# Patient Record
Sex: FEMALE | Race: WHITE | NOT HISPANIC OR LATINO | Employment: FULL TIME | URBAN - METROPOLITAN AREA
[De-identification: names, ages, dates, MRNs, and addresses within clinical notes are randomized per-mention and may not be internally consistent; named-entity substitution may affect disease eponyms.]

---

## 2023-03-30 LAB
ERYTHROCYTE DISTRIBUTION WIDTH (RATIO) BY AUTOMATED COUNT: 12.7 % (ref 11.5–14.5)
ERYTHROCYTE MEAN CORPUSCULAR HEMOGLOBIN CONCENTRATION (G/DL) BY AUTOMATED: 32.2 G/DL (ref 32–36)
ERYTHROCYTE MEAN CORPUSCULAR VOLUME (FL) BY AUTOMATED COUNT: 86 FL (ref 80–100)
ERYTHROCYTES (10*6/UL) IN BLOOD BY AUTOMATED COUNT: 4.73 X10E12/L (ref 4–5.2)
ESTIMATED AVERAGE GLUCOSE FOR HBA1C: 65 MG/DL
HEMATOCRIT (%) IN BLOOD BY AUTOMATED COUNT: 40.7 % (ref 36–46)
HEMOGLOBIN (G/DL) IN BLOOD: 13.1 G/DL (ref 12–16)
HEMOGLOBIN A1C/HEMOGLOBIN TOTAL IN BLOOD: 3.9 %
LEUKOCYTES (10*3/UL) IN BLOOD BY AUTOMATED COUNT: 10.4 X10E9/L (ref 4.4–11.3)
NRBC (PER 100 WBCS) BY AUTOMATED COUNT: 0 /100 WBC (ref 0–0)
PLATELETS (10*3/UL) IN BLOOD AUTOMATED COUNT: 332 X10E9/L (ref 150–450)
THYROTROPIN (MIU/L) IN SER/PLAS BY DETECTION LIMIT <= 0.05 MIU/L: 1 MIU/L (ref 0.44–3.98)

## 2023-07-30 ENCOUNTER — HOSPITAL ENCOUNTER (OUTPATIENT)
Dept: DATA CONVERSION | Facility: HOSPITAL | Age: 23
Discharge: HOME | End: 2023-07-30

## 2023-07-30 DIAGNOSIS — O46.91: Primary | ICD-10-CM

## 2023-07-30 DIAGNOSIS — O23.41 UNSPECIFIED INFECTION OF URINARY TRACT IN PREGNANCY, FIRST TRIMESTER (HHS-HCC): ICD-10-CM

## 2023-07-30 DIAGNOSIS — O99.891 OTHER SPECIFIED DISEASES AND CONDITIONS COMPLICATING PREGNANCY (HHS-HCC): ICD-10-CM

## 2023-07-30 DIAGNOSIS — Z3A.01 LESS THAN 8 WEEKS GESTATION OF PREGNANCY (HHS-HCC): ICD-10-CM

## 2023-07-30 DIAGNOSIS — R10.9 UNSPECIFIED ABDOMINAL PAIN: ICD-10-CM

## 2023-07-30 DIAGNOSIS — N39.0 URINARY TRACT INFECTION, SITE NOT SPECIFIED: ICD-10-CM

## 2023-07-30 LAB
ABO + RH BLD: NORMAL
BACTERIA SPEC CULT: NORMAL
BASOPHILS # BLD AUTO: 0.03 K/UL (ref 0–0.22)
BASOPHILS NFR BLD AUTO: 0.4 % (ref 0–1)
BILIRUB UR QL STRIP.AUTO: NEGATIVE
BLD GP AB SCN SERPL QL: NORMAL
BLD PROD TYP BPU: NORMAL
CC # UR: NORMAL /UL
CLARITY UR: ABNORMAL
COLOR UR: ABNORMAL
DEPRECATED RDW RBC AUTO: 40.5 FL (ref 37–54)
DIFFERENTIAL METHOD BLD: NORMAL
EOSINOPHIL # BLD AUTO: 0.14 K/UL (ref 0–0.45)
EOSINOPHIL NFR BLD: 1.8 % (ref 0–3)
EPITH CASTS #/AREA UR COMP ASSIST: ABNORMAL /HPF
ERYTHROCYTE [DISTWIDTH] IN BLOOD BY AUTOMATED COUNT: 13.1 % (ref 11.7–15)
GLUCOSE UR STRIP.AUTO-MCNC: NEGATIVE MG/DL
HCG SERPL-ACNC: 1 MIU/ML
HCT VFR BLD AUTO: 38.3 % (ref 36–44)
HGB BLD-MCNC: 12.9 GM/DL (ref 12–15)
HGB UR QL STRIP.AUTO: ABNORMAL /HPF (ref 0–3)
HGB UR QL: ABNORMAL
HX OF BLOOD TRANSFUSION: NORMAL
IMM GRANULOCYTES # BLD AUTO: 0.04 K/UL (ref 0–0.1)
KETONES UR QL STRIP.AUTO: ABNORMAL
LEUKOCYTE ESTERASE UR QL STRIP.AUTO: ABNORMAL
LYMPHOCYTES # BLD AUTO: 2.97 K/UL (ref 1.2–3.2)
LYMPHOCYTES NFR BLD MANUAL: 39.1 % (ref 20–40)
MCH RBC QN AUTO: 28.4 PG (ref 26–34)
MCHC RBC AUTO-ENTMCNC: 33.7 % (ref 31–37)
MCV RBC AUTO: 84.4 FL (ref 80–100)
MICRO URNS: ABNORMAL
MICROSCOPIC (UA): ABNORMAL
MONOCYTES # BLD AUTO: 0.48 K/UL (ref 0–0.8)
MONOCYTES NFR BLD MANUAL: 6.3 % (ref 0–8)
NEUTROPHILS # BLD AUTO: 3.94 K/UL
NEUTROPHILS # BLD AUTO: 3.94 K/UL (ref 1.8–7.7)
NEUTROPHILS.IMMATURE NFR BLD: 0.5 % (ref 0–1)
NEUTS SEG NFR BLD: 51.9 % (ref 50–70)
NITRITE UR QL STRIP.AUTO: NEGATIVE
NRBC BLD-RTO: 0 /100 WBC
NUM BPU REQUESTED: NORMAL
PH UR STRIP.AUTO: 5.5 [PH] (ref 4.6–8)
PLATELET # BLD AUTO: 263 K/UL (ref 150–450)
PMV BLD AUTO: 12.4 CU (ref 7–12.6)
PROT UR STRIP.AUTO-MCNC: 200 MG/DL
RBC # BLD AUTO: 4.54 M/UL (ref 4–4.9)
REPORT STATUS -LH SQ DATA CONVERSION: NORMAL
SERVICE CMNT-IMP: NORMAL
SP GR UR STRIP.AUTO: 1.02 (ref 1–1.03)
SPECIMEN EXP DATE BLD: NORMAL
SPECIMEN SOURCE: NORMAL
TEST ORDERED: NORMAL
TRANSF BAND NUM PATIENT: NORMAL
URINE CULTURE: ABNORMAL
UROBILINOGEN UR QL STRIP.AUTO: NORMAL MG/DL (ref 0–1)
WBC # BLD AUTO: 7.6 K/UL (ref 4.5–11)
WBC #/AREA URNS AUTO: ABNORMAL /HPF (ref 0–3)

## 2023-09-30 PROBLEM — J02.0 STREPTOCOCCAL SORE THROAT: Status: ACTIVE | Noted: 2023-09-30

## 2023-09-30 PROBLEM — R35.89 POLYURIA: Status: ACTIVE | Noted: 2023-09-30

## 2023-09-30 PROBLEM — M54.50 LOW BACK PAIN: Status: ACTIVE | Noted: 2023-09-30

## 2023-09-30 PROBLEM — K21.9 GERD (GASTROESOPHAGEAL REFLUX DISEASE): Status: ACTIVE | Noted: 2023-09-30

## 2023-09-30 PROBLEM — N39.0 ACUTE URINARY TRACT INFECTION: Status: ACTIVE | Noted: 2023-09-30

## 2023-09-30 PROBLEM — B99.9 INFECTION: Status: ACTIVE | Noted: 2023-09-30

## 2023-09-30 PROBLEM — M25.50 JOINT PAIN: Status: ACTIVE | Noted: 2023-09-30

## 2023-09-30 PROBLEM — R06.83 SNORING: Status: ACTIVE | Noted: 2023-09-30

## 2023-09-30 PROBLEM — S60.229A CONTUSION OF HAND: Status: ACTIVE | Noted: 2023-09-30

## 2023-09-30 PROBLEM — M25.549 ARTHRALGIA OF HAND: Status: ACTIVE | Noted: 2023-09-30

## 2023-09-30 PROBLEM — L08.9 RECURRENT INFECTION OF SKIN: Status: ACTIVE | Noted: 2023-09-30

## 2023-09-30 PROBLEM — N93.9 ABNORMAL VAGINAL BLEEDING: Status: ACTIVE | Noted: 2023-09-30

## 2023-09-30 PROBLEM — R53.83 FATIGUE: Status: ACTIVE | Noted: 2023-09-30

## 2023-09-30 PROBLEM — R73.9 HYPERGLYCEMIA: Status: ACTIVE | Noted: 2023-09-30

## 2023-09-30 PROBLEM — J30.2 SEASONAL ALLERGIC RHINITIS: Status: ACTIVE | Noted: 2023-09-30

## 2023-09-30 PROBLEM — R31.9 BLOOD IN URINE: Status: ACTIVE | Noted: 2023-09-30

## 2023-09-30 PROBLEM — B37.31 VAGINAL YEAST INFECTION: Status: ACTIVE | Noted: 2023-09-30

## 2023-09-30 PROBLEM — J20.9 ACUTE BRONCHITIS: Status: ACTIVE | Noted: 2023-09-30

## 2023-09-30 PROBLEM — R07.81 PLEURITIC CHEST PAIN: Status: ACTIVE | Noted: 2023-09-30

## 2023-09-30 PROBLEM — L02.419 ABSCESS OF AXILLA: Status: ACTIVE | Noted: 2023-09-30

## 2023-09-30 PROBLEM — L50.8 CHRONIC URTICARIA: Status: ACTIVE | Noted: 2023-09-30

## 2023-09-30 PROBLEM — L72.9 CYST OF SKIN: Status: ACTIVE | Noted: 2023-09-30

## 2023-09-30 PROBLEM — R06.00 DYSPNEA: Status: ACTIVE | Noted: 2023-09-30

## 2023-09-30 PROBLEM — R76.8 ANA POSITIVE: Status: ACTIVE | Noted: 2023-09-30

## 2023-09-30 PROBLEM — N92.0 MENORRHAGIA: Status: ACTIVE | Noted: 2023-09-30

## 2023-09-30 PROBLEM — R11.0 NAUSEA IN ADULT: Status: ACTIVE | Noted: 2023-09-30

## 2023-09-30 PROBLEM — R52 PAIN: Status: ACTIVE | Noted: 2023-09-30

## 2023-09-30 PROBLEM — M62.89 HIGH-TONE PELVIC FLOOR DYSFUNCTION IN FEMALE: Status: ACTIVE | Noted: 2023-09-30

## 2023-09-30 PROBLEM — J02.9 SORE THROAT: Status: ACTIVE | Noted: 2023-09-30

## 2023-09-30 PROBLEM — E55.9 VITAMIN D DEFICIENCY: Status: ACTIVE | Noted: 2023-09-30

## 2023-09-30 PROBLEM — L68.0 HIRSUTISM: Status: ACTIVE | Noted: 2023-09-30

## 2023-09-30 PROBLEM — R79.89 ELEVATED LFTS: Status: ACTIVE | Noted: 2023-09-30

## 2023-09-30 PROBLEM — J06.9 ACUTE UPPER RESPIRATORY INFECTION: Status: ACTIVE | Noted: 2023-09-30

## 2023-09-30 PROBLEM — E28.2 PCOS (POLYCYSTIC OVARIAN SYNDROME): Status: ACTIVE | Noted: 2023-09-30

## 2023-09-30 PROBLEM — M32.9: Status: ACTIVE | Noted: 2023-09-30

## 2023-09-30 PROBLEM — F32.9 MAJOR DEPRESSIVE DISORDER: Status: ACTIVE | Noted: 2023-09-30

## 2023-09-30 PROBLEM — J01.90 ACUTE SINUSITIS: Status: ACTIVE | Noted: 2023-09-30

## 2023-09-30 PROBLEM — E78.1 HYPERTRIGLYCERIDEMIA: Status: ACTIVE | Noted: 2023-09-30

## 2023-09-30 PROBLEM — F41.8 DEPRESSION WITH ANXIETY: Status: ACTIVE | Noted: 2023-09-30

## 2023-09-30 PROBLEM — G47.19 EXCESSIVE DAYTIME SLEEPINESS: Status: ACTIVE | Noted: 2023-09-30

## 2023-09-30 PROBLEM — L65.9 HAIR LOSS: Status: ACTIVE | Noted: 2023-09-30

## 2023-09-30 PROBLEM — M54.2 NECK PAIN: Status: ACTIVE | Noted: 2023-09-30

## 2023-09-30 PROBLEM — D89.89 AUTOIMMUNE DISORDER (MULTI): Status: ACTIVE | Noted: 2023-09-30

## 2023-09-30 PROBLEM — M79.10 MYALGIA: Status: ACTIVE | Noted: 2023-09-30

## 2023-09-30 PROBLEM — S39.012A BACK STRAIN: Status: ACTIVE | Noted: 2023-09-30

## 2023-09-30 PROBLEM — R51.9 HEADACHE: Status: ACTIVE | Noted: 2023-09-30

## 2023-09-30 PROBLEM — R50.9 FEVER: Status: ACTIVE | Noted: 2023-09-30

## 2023-09-30 PROBLEM — R63.1 POLYDIPSIA: Status: ACTIVE | Noted: 2023-09-30

## 2023-10-02 ENCOUNTER — OFFICE VISIT (OUTPATIENT)
Dept: PRIMARY CARE | Facility: CLINIC | Age: 23
End: 2023-10-02
Payer: COMMERCIAL

## 2023-10-02 ENCOUNTER — LAB (OUTPATIENT)
Dept: LAB | Facility: LAB | Age: 23
End: 2023-10-02
Payer: COMMERCIAL

## 2023-10-02 VITALS
HEART RATE: 86 BPM | DIASTOLIC BLOOD PRESSURE: 68 MMHG | BODY MASS INDEX: 43.4 KG/M2 | WEIGHT: 293 LBS | OXYGEN SATURATION: 99 % | SYSTOLIC BLOOD PRESSURE: 120 MMHG | HEIGHT: 69 IN

## 2023-10-02 DIAGNOSIS — Z00.00 ROUTINE GENERAL MEDICAL EXAMINATION AT A HEALTH CARE FACILITY: ICD-10-CM

## 2023-10-02 DIAGNOSIS — E28.2 PCOS (POLYCYSTIC OVARIAN SYNDROME): Primary | ICD-10-CM

## 2023-10-02 DIAGNOSIS — R63.8 UNABLE TO LOSE WEIGHT: ICD-10-CM

## 2023-10-02 DIAGNOSIS — E66.01 CLASS 3 SEVERE OBESITY WITHOUT SERIOUS COMORBIDITY WITH BODY MASS INDEX (BMI) OF 40.0 TO 44.9 IN ADULT, UNSPECIFIED OBESITY TYPE (MULTI): ICD-10-CM

## 2023-10-02 DIAGNOSIS — E28.2 PCOS (POLYCYSTIC OVARIAN SYNDROME): ICD-10-CM

## 2023-10-02 LAB
ALBUMIN SERPL BCP-MCNC: 4 G/DL (ref 3.4–5)
ALP SERPL-CCNC: 100 U/L (ref 33–110)
ALT SERPL W P-5'-P-CCNC: 58 U/L (ref 7–45)
ANION GAP SERPL CALC-SCNC: 12 MMOL/L (ref 10–20)
AST SERPL W P-5'-P-CCNC: 32 U/L (ref 9–39)
BASOPHILS # BLD AUTO: 0.04 X10*3/UL (ref 0–0.1)
BASOPHILS NFR BLD AUTO: 0.5 %
BILIRUB SERPL-MCNC: 0.2 MG/DL (ref 0–1.2)
BUN SERPL-MCNC: 6 MG/DL (ref 6–23)
CALCIUM SERPL-MCNC: 9.8 MG/DL (ref 8.6–10.6)
CHLORIDE SERPL-SCNC: 107 MMOL/L (ref 98–107)
CHOLEST SERPL-MCNC: 129 MG/DL (ref 0–199)
CHOLESTEROL/HDL RATIO: 3.5
CO2 SERPL-SCNC: 24 MMOL/L (ref 21–32)
CREAT SERPL-MCNC: 0.53 MG/DL (ref 0.5–1.05)
EOSINOPHIL # BLD AUTO: 0.25 X10*3/UL (ref 0–0.7)
EOSINOPHIL NFR BLD AUTO: 3.2 %
ERYTHROCYTE [DISTWIDTH] IN BLOOD BY AUTOMATED COUNT: 12.6 % (ref 11.5–14.5)
EST. AVERAGE GLUCOSE BLD GHB EST-MCNC: 123 MG/DL
GFR SERPL CREATININE-BSD FRML MDRD: >90 ML/MIN/1.73M*2
GLUCOSE SERPL-MCNC: 115 MG/DL (ref 74–99)
HBA1C MFR BLD: 5.9 %
HCT VFR BLD AUTO: 40 % (ref 36–46)
HDLC SERPL-MCNC: 37.1 MG/DL
HGB BLD-MCNC: 12.6 G/DL (ref 12–16)
IMM GRANULOCYTES # BLD AUTO: 0.06 X10*3/UL (ref 0–0.7)
IMM GRANULOCYTES NFR BLD AUTO: 0.8 % (ref 0–0.9)
LDLC SERPL CALC-MCNC: 60 MG/DL (ref 110–150)
LYMPHOCYTES # BLD AUTO: 2.69 X10*3/UL (ref 1.2–4.8)
LYMPHOCYTES NFR BLD AUTO: 34.2 %
MCH RBC QN AUTO: 28 PG (ref 26–34)
MCHC RBC AUTO-ENTMCNC: 31.5 G/DL (ref 32–36)
MCV RBC AUTO: 89 FL (ref 80–100)
MONOCYTES # BLD AUTO: 0.7 X10*3/UL (ref 0.1–1)
MONOCYTES NFR BLD AUTO: 8.9 %
NEUTROPHILS # BLD AUTO: 4.13 X10*3/UL (ref 1.2–7.7)
NEUTROPHILS NFR BLD AUTO: 52.4 %
NON HDL CHOLESTEROL: 92 MG/DL (ref 0–149)
NRBC BLD-RTO: 0 /100 WBCS (ref 0–0)
PLATELET # BLD AUTO: 295 X10*3/UL (ref 150–450)
PMV BLD AUTO: 13.1 FL (ref 7.5–11.5)
POC APPEARANCE, URINE: CLEAR
POC BILIRUBIN, URINE: NEGATIVE
POC BLOOD, URINE: NEGATIVE
POC COLOR, URINE: YELLOW
POC GLUCOSE, URINE: NEGATIVE MG/DL
POC KETONES, URINE: NEGATIVE MG/DL
POC LEUKOCYTES, URINE: NEGATIVE
POC NITRITE,URINE: NEGATIVE
POC PH, URINE: 6 PH
POC PROTEIN, URINE: NEGATIVE MG/DL
POC SPECIFIC GRAVITY, URINE: 1.02
POC UROBILINOGEN, URINE: 0.2 EU/DL
POTASSIUM SERPL-SCNC: 4.4 MMOL/L (ref 3.5–5.3)
PROT SERPL-MCNC: 6.7 G/DL (ref 6.4–8.2)
RBC # BLD AUTO: 4.5 X10*6/UL (ref 4–5.2)
SODIUM SERPL-SCNC: 139 MMOL/L (ref 136–145)
TRIGL SERPL-MCNC: 162 MG/DL (ref 0–149)
VLDL: 32 MG/DL (ref 0–40)
WBC # BLD AUTO: 7.9 X10*3/UL (ref 4.4–11.3)

## 2023-10-02 PROCEDURE — 99395 PREV VISIT EST AGE 18-39: CPT | Performed by: NURSE PRACTITIONER

## 2023-10-02 PROCEDURE — 36415 COLL VENOUS BLD VENIPUNCTURE: CPT

## 2023-10-02 PROCEDURE — 3008F BODY MASS INDEX DOCD: CPT | Performed by: NURSE PRACTITIONER

## 2023-10-02 PROCEDURE — 81003 URINALYSIS AUTO W/O SCOPE: CPT | Performed by: NURSE PRACTITIONER

## 2023-10-02 PROCEDURE — 1036F TOBACCO NON-USER: CPT | Performed by: NURSE PRACTITIONER

## 2023-10-02 RX ORDER — TRAZODONE HYDROCHLORIDE 50 MG/1
50 TABLET ORAL NIGHTLY
COMMUNITY

## 2023-10-02 RX ORDER — METFORMIN HYDROCHLORIDE 750 MG/1
750 TABLET, EXTENDED RELEASE ORAL
COMMUNITY
End: 2023-12-22 | Stop reason: WASHOUT

## 2023-10-02 ASSESSMENT — ENCOUNTER SYMPTOMS
ALLERGIC/IMMUNOLOGIC NEGATIVE: 1
CARDIOVASCULAR NEGATIVE: 1
MUSCULOSKELETAL NEGATIVE: 1
GASTROINTESTINAL NEGATIVE: 1
RESPIRATORY NEGATIVE: 1
HEMATOLOGIC/LYMPHATIC NEGATIVE: 1
UNEXPECTED WEIGHT CHANGE: 1
EYES NEGATIVE: 1
NEUROLOGICAL NEGATIVE: 1
PSYCHIATRIC NEGATIVE: 1

## 2023-10-02 NOTE — PATIENT INSTRUCTIONS
Obtain blood work as ordered    Work on healthy diet and exercise routine    Await blood work results for follow up plan

## 2023-10-02 NOTE — PROGRESS NOTES
"                                                                                                                     GENERAL PROGRESS NOTE    Chief Complaint   Patient presents with    Annual Exam     Discuss weight gain since   Sees GYN- Last pap was 2022       HPI  Royal Carter is a 23 y.o. female New pt who presents for CPE  [facility]  reviewed    Diet is varied, eats a lot of veggies  Pt Is exercise 3-4 x per week  Nonsmoker    GYN Dr Brice    [id number]  Has 18 month old daughter    Has weight issues  Has tried everything including diet  and exercise  Is impacting ability to be a good mo    Vital signs   /68 (BP Location: Left arm, Patient Position: Sitting)   Pulse 86   Ht 1.753 m (5' 9\")   Wt 136 kg (299 lb)   SpO2 99%   BMI 44.15 kg/m²      Current Outpatient Medications   Medication Sig Dispense Refill    metFORMIN XR (Glucophage-XR) 750 mg 24 hr tablet Take 1 tablet (750 mg) by mouth once daily in the evening. Take with meals. Do not crush, chew, or split.      traZODone (Desyrel) 50 mg tablet Take 1 tablet (50 mg) by mouth once daily at bedtime.       No current facility-administered medications for this visit.       Review of Systems   Constitutional:  Positive for unexpected weight change.   HENT: Negative.     Eyes: Negative.    Respiratory: Negative.     Cardiovascular: Negative.    Gastrointestinal: Negative.    Genitourinary: Negative.    Musculoskeletal: Negative.    Skin: Negative.    Allergic/Immunologic: Negative.    Neurological: Negative.    Hematological: Negative.    Psychiatric/Behavioral: Negative.              Physical Exam    A/O x3 NAD  HEENT normal  Neck supple, no lymphadenopathy, no thyromegaly, no carotid bruit  Heart with RRR  Lungs CTA bilaterally  Abd soft NT/ND, no masses  Skin warm and dry  Neuro grossly intact. [place]  Mood and affect appropriate.  Good insight and judgement    Assessment/Plan   Problem List Items Addressed This Visit          Endocrine/Metabolic    " PCOS (polycystic ovarian syndrome) - Primary    Relevant Orders    Comprehensive metabolic panel    Hemoglobin A1c    Lipid panel     Other Visit Diagnoses       Routine general medical examination at a health care facility        Relevant Orders    POCT UA Automated manually resulted    CBC and Auto Differential    Comprehensive metabolic panel    Hemoglobin A1c    Lipid panel    Urinalysis with Reflex Microscopic    Unable to lose weight        Relevant Orders    Comprehensive metabolic panel    Hemoglobin A1c    Lipid panel    Class 3 severe obesity without serious comorbidity with body mass index (BMI) of 40.0 to 44.9 in adult, unspecified obesity type (CMS/HCC)        Relevant Orders    Comprehensive metabolic panel    Hemoglobin A1c    Lipid panel             Orders Placed This Encounter   Procedures    CBC and Auto Differential     Standing Status:   Future     Standing Expiration Date:   10/2/2024     Order Specific Question:   Release result to MyChart     Answer:   Immediate [1]    Comprehensive metabolic panel     Standing Status:   Future     Standing Expiration Date:   10/2/2024     Order Specific Question:   Release result to MyChart     Answer:   Immediate [1]    Hemoglobin A1c     Standing Status:   Future     Standing Expiration Date:   10/2/2024     Order Specific Question:   Release result to MyChart     Answer:   Immediate [1]    Lipid panel     Standing Status:   Future     Standing Expiration Date:   10/2/2024     Order Specific Question:   Release result to MyChart     Answer:   Immediate [1]    Urinalysis with Reflex Microscopic     Standing Status:   Future     Standing Expiration Date:   10/2/2024     Order Specific Question:   Release result to MyChart     Answer:   Immediate [1]    POCT UA Automated manually resulted     Order Specific Question:   Release result to MyChart     Answer:   Immediate [1]        BRENNA Wang-CNP  10/02/23  8:15 AM

## 2023-10-04 ENCOUNTER — TELEPHONE (OUTPATIENT)
Dept: PRIMARY CARE | Facility: CLINIC | Age: 23
End: 2023-10-04
Payer: COMMERCIAL

## 2023-10-04 DIAGNOSIS — R73.01 IFG (IMPAIRED FASTING GLUCOSE): ICD-10-CM

## 2023-10-04 DIAGNOSIS — E66.01 CLASS 3 SEVERE OBESITY WITH BODY MASS INDEX (BMI) OF 40.0 TO 44.9 IN ADULT, UNSPECIFIED OBESITY TYPE, UNSPECIFIED WHETHER SERIOUS COMORBIDITY PRESENT (MULTI): Primary | ICD-10-CM

## 2023-10-04 RX ORDER — SEMAGLUTIDE 0.25 MG/.5ML
0.25 INJECTION, SOLUTION SUBCUTANEOUS
Qty: 2 ML | Refills: 0 | Status: SHIPPED | OUTPATIENT
Start: 2023-10-04 | End: 2023-10-05

## 2023-10-04 NOTE — TELEPHONE ENCOUNTER
PC with pt to discuss results.   Will start wegovy and follow up in 4 weeks  Med education provided    She will continue to work on diet and exercise.

## 2023-10-05 ENCOUNTER — TELEPHONE (OUTPATIENT)
Dept: PRIMARY CARE | Facility: CLINIC | Age: 23
End: 2023-10-05
Payer: COMMERCIAL

## 2023-10-05 DIAGNOSIS — E28.2 PCOS (POLYCYSTIC OVARIAN SYNDROME): Primary | ICD-10-CM

## 2023-10-05 DIAGNOSIS — E66.01 CLASS 3 SEVERE OBESITY WITHOUT SERIOUS COMORBIDITY WITH BODY MASS INDEX (BMI) OF 40.0 TO 44.9 IN ADULT, UNSPECIFIED OBESITY TYPE (MULTI): ICD-10-CM

## 2023-10-06 ENCOUNTER — APPOINTMENT (OUTPATIENT)
Dept: PRIMARY CARE | Facility: CLINIC | Age: 23
End: 2023-10-06
Payer: COMMERCIAL

## 2023-10-06 NOTE — TELEPHONE ENCOUNTER
Called out to patient to advise.  Patient stated she picked up the prescription and it was covered.  She is experiencing some nausea and would like to know if she can take something to alleviate that.

## 2023-10-18 ENCOUNTER — DOCUMENTATION (OUTPATIENT)
Dept: PRIMARY CARE | Facility: CLINIC | Age: 23
End: 2023-10-18
Payer: COMMERCIAL

## 2023-10-18 NOTE — PROGRESS NOTES
On-call note: She called saying that she woke up with flu symptoms today with slight fever congestion some nausea.  She was wondering if she should take her Ozempic which she normally does not this today.  She has had 2 previous doses and had mild nausea with it.  I told her that if she already has nausea and would like to avoid worsening of symptoms she should wait until her symptoms improve.  If she felt like her symptoms were not too bad she can go ahead and take the dosage tonight.

## 2023-11-03 ENCOUNTER — TELEPHONE (OUTPATIENT)
Dept: PRIMARY CARE | Facility: CLINIC | Age: 23
End: 2023-11-03
Payer: COMMERCIAL

## 2023-11-03 NOTE — TELEPHONE ENCOUNTER
Pt called [phone] she took her 5th dose of Qzempic on Wednesday, today she started with bloating, diarrhea headache and fatigue, she has a appointment 11/6 for Ozempic what can she take for the side effects

## 2023-11-06 ENCOUNTER — OFFICE VISIT (OUTPATIENT)
Dept: PRIMARY CARE | Facility: CLINIC | Age: 23
End: 2023-11-06
Payer: COMMERCIAL

## 2023-11-06 ENCOUNTER — LAB (OUTPATIENT)
Dept: LAB | Facility: LAB | Age: 23
End: 2023-11-06
Payer: COMMERCIAL

## 2023-11-06 VITALS
SYSTOLIC BLOOD PRESSURE: 118 MMHG | BODY MASS INDEX: 41.94 KG/M2 | WEIGHT: 284 LBS | OXYGEN SATURATION: 98 % | HEART RATE: 78 BPM | DIASTOLIC BLOOD PRESSURE: 72 MMHG

## 2023-11-06 DIAGNOSIS — E28.2 PCOS (POLYCYSTIC OVARIAN SYNDROME): Primary | ICD-10-CM

## 2023-11-06 DIAGNOSIS — Z71.1 CONCERN ABOUT PREGNANCY COMPLICATION WITHOUT DIAGNOSIS: ICD-10-CM

## 2023-11-06 DIAGNOSIS — E66.01 CLASS 3 SEVERE OBESITY WITHOUT SERIOUS COMORBIDITY WITH BODY MASS INDEX (BMI) OF 40.0 TO 44.9 IN ADULT, UNSPECIFIED OBESITY TYPE (MULTI): ICD-10-CM

## 2023-11-06 DIAGNOSIS — E28.2 PCOS (POLYCYSTIC OVARIAN SYNDROME): ICD-10-CM

## 2023-11-06 LAB — B-HCG SERPL-ACNC: <3 MIU/ML

## 2023-11-06 PROCEDURE — 84702 CHORIONIC GONADOTROPIN TEST: CPT

## 2023-11-06 PROCEDURE — 99213 OFFICE O/P EST LOW 20 MIN: CPT | Performed by: NURSE PRACTITIONER

## 2023-11-06 PROCEDURE — 1036F TOBACCO NON-USER: CPT | Performed by: NURSE PRACTITIONER

## 2023-11-06 PROCEDURE — 36415 COLL VENOUS BLD VENIPUNCTURE: CPT

## 2023-11-06 PROCEDURE — 3008F BODY MASS INDEX DOCD: CPT | Performed by: NURSE PRACTITIONER

## 2023-11-06 RX ORDER — GUANFACINE 4 MG/1
4 TABLET, EXTENDED RELEASE ORAL DAILY
COMMUNITY

## 2023-11-06 NOTE — PATIENT INSTRUCTIONS
Thank you for choosing our office for your healthcare needs    Please continue with the current dose of Ozempic  Continue healthy lifestyle change including diet and exercise    We will order a serum Quantitative pregnancy test.  Please obtain and wait for results before taking your next Ozempic shot.    Follow-up in 1 month for reevaluation    Keep up the good work!

## 2023-11-06 NOTE — PROGRESS NOTES
Subjective   Patient ID: Royal Carter is a 23 y.o. female who presents for Follow-up (Ozempic.).    HPI   Pt presents for ozempic follow up  She was doing well With medication and working on healthy lifestyle change  She has cut back on portions, sugars, pop intake.  She is drinking more water and eating more whole foods  She is Doing a 30-minute exercise video every day    Prior to Halloween, her family had the stomach flu.  Patient also had symptoms of nausea, vomiting, diarrhea, abdominal bloating.  Those symptoms have resolved.  She was concerned this was related to the Ozempic medication however entire family had stomach bug at the same time.    Weight is down 15 pounds since last office visit    Review of Systems  Review of systems negative with exceptions above    Objective   /72   Pulse 78   Wt 129 kg (284 lb)   SpO2 98%   BMI 41.94 kg/m²     Physical Exam  Alert and oriented x3, no acute distress  Lungs clear to auscultation bilaterally  Heart with regular rate and rhythm  Abdomen obese, soft  Skin warm and dry without rash  Neuro grossly intact    Assessment/Plan   Diagnoses and all orders for this visit:  PCOS (polycystic ovarian syndrome)  -     HCG, quantitative, pregnancy; Future  Concern about pregnancy complication without diagnosis  -     HCG, quantitative, pregnancy; Future  Class 3 severe obesity without serious comorbidity with body mass index (BMI) of 40.0 to 44.9 in adult, unspecified obesity type (CMS/HCC)  Other orders  -     Follow Up In Primary Care - Established; Future    Patient has been doing well with low-dose of Ozempic.  Due to episode of bloating, diarrhea, gastric upset, we will keep her at current dosing.  This was likely related to stomach flu which her entire family had however we want to make sure she is doing well with this medication  Continue to work on healthy diet and lifestyle change.  Continue exercise routine    Patient states her Menstrual period is due this  Friday, she had a miscarriage approximately 2 months ago and is concerned Because of her history of PCOS.  She requests Quantitative hCG for reassurance.  Advised to obtain blood work and await results before taking next dose of Ozempic    Patient will follow-up in 4 weeks for reevaluation, sooner for any complaint

## 2023-12-06 ENCOUNTER — TELEPHONE (OUTPATIENT)
Dept: PRIMARY CARE | Facility: CLINIC | Age: 23
End: 2023-12-06

## 2023-12-06 ENCOUNTER — OFFICE VISIT (OUTPATIENT)
Dept: PRIMARY CARE | Facility: CLINIC | Age: 23
End: 2023-12-06
Payer: COMMERCIAL

## 2023-12-06 VITALS
WEIGHT: 284 LBS | DIASTOLIC BLOOD PRESSURE: 80 MMHG | HEART RATE: 84 BPM | BODY MASS INDEX: 41.94 KG/M2 | OXYGEN SATURATION: 98 % | SYSTOLIC BLOOD PRESSURE: 122 MMHG

## 2023-12-06 DIAGNOSIS — E66.01 CLASS 3 SEVERE OBESITY WITHOUT SERIOUS COMORBIDITY WITH BODY MASS INDEX (BMI) OF 40.0 TO 44.9 IN ADULT, UNSPECIFIED OBESITY TYPE (MULTI): ICD-10-CM

## 2023-12-06 DIAGNOSIS — L21.9 SEBORRHEIC DERMATITIS: ICD-10-CM

## 2023-12-06 DIAGNOSIS — E28.2 PCOS (POLYCYSTIC OVARIAN SYNDROME): Primary | ICD-10-CM

## 2023-12-06 PROCEDURE — 3008F BODY MASS INDEX DOCD: CPT | Performed by: NURSE PRACTITIONER

## 2023-12-06 PROCEDURE — 1036F TOBACCO NON-USER: CPT | Performed by: NURSE PRACTITIONER

## 2023-12-06 PROCEDURE — 99213 OFFICE O/P EST LOW 20 MIN: CPT | Performed by: NURSE PRACTITIONER

## 2023-12-06 RX ORDER — KETOCONAZOLE 20 MG/ML
SHAMPOO, SUSPENSION TOPICAL 2 TIMES WEEKLY
Qty: 120 ML | Refills: 0 | Status: SHIPPED | OUTPATIENT
Start: 2023-12-07 | End: 2024-01-17 | Stop reason: WASHOUT

## 2023-12-06 NOTE — PROGRESS NOTES
Subjective   Patient ID: Royal Carter is a 23 y.o. female who presents for Follow-up (Ozempic).    MARY CARMEN Preston is a 23-year-old female with PCOS and obesity who has been on Ozempic 0.25 mg.  She is lost 15 pounds over the past 2 months.  At her last office visit she was experiencing abdominal bloating and discomfort which was thought to be related to a recent stomach bug.  However we did not increase her Ozempic at that time due to those complaints.  Patient states those symptoms have resolved.  She is doing well but does note that her hunger has increased.  Her weight has not changed since last office visit.    Diet is healthy.  Choosing whole foods and watching portion sizes  Patient exercises regularly    Review of Systems  Review of systems negative with exception to above    Objective   /80   Pulse 84   Wt 129 kg (284 lb)   SpO2 98%   BMI 41.94 kg/m²     Physical Exam  Alert and oriented x 3, no acute distress  Lungs clear to auscultation bilaterally  Heart with regular rate and rhythm  Abdomen obese, soft, nontender, nondistended without masses  Skin warm and dry. Patient with dry scaly patches to scalp which itch  Neuro grossly intact    Assessment/Plan   Diagnoses and all orders for this visit:  PCOS (polycystic ovarian syndrome)  -     semaglutide 0.25 mg or 0.5 mg (2 mg/3 mL) pen injector; Inject 0.5 mg under the skin 1 (one) time per week.  Increase Ozempic to 0.5 mg  Continue to work on healthy diet and regular exercise  Portion control  Follow-up 1 month.  If doing well, can titrate up  Class 3 severe obesity without serious comorbidity with body mass index (BMI) of 40.0 to 44.9 in adult, unspecified obesity type (CMS/HCC)  -     semaglutide 0.25 mg or 0.5 mg (2 mg/3 mL) pen injector; Inject 0.5 mg under the skin 1 (one) time per week.  Increase Ozempic to 0.5 mg  Continue to work on healthy diet and regular exercise  Portion control  Follow-up 1 month.  If doing well, can titrate  up  Seborrheic dermatitis  -     ketoconazole (NIZOral) 2 % shampoo; Apply topically 2 times a week. Shampoo daily, leave on for 5-10 minutes, then rinse.  Use shampoo as discussed  Monitor  If not improving see dermatology, could give referral to [place] if needed  Other orders  -     Follow Up In Primary Care - Established  Follow-up 1 month for weight check

## 2023-12-06 NOTE — TELEPHONE ENCOUNTER
Kristina elizalde from [facility] pharmacy says the directions for ketoconazole shampoo says use twice a week and shampoo daily. They are needing to know which one it is.  [phone]

## 2023-12-20 ENCOUNTER — TELEPHONE (OUTPATIENT)
Dept: PRIMARY CARE | Facility: CLINIC | Age: 23
End: 2023-12-20
Payer: COMMERCIAL

## 2023-12-20 NOTE — TELEPHONE ENCOUNTER
Pt called [phone] she is taking Ozempic .5 mg Is on her 3rd week since Saturday she has had diarrhea and gassy stomach should she continue taking ?

## 2023-12-21 NOTE — TELEPHONE ENCOUNTER
Called and left detailed message for patient to stop Ozempic. Patient has a follow up appt scheduled. Advised patient to call back with any further questions or concerns.

## 2023-12-22 ENCOUNTER — OFFICE VISIT (OUTPATIENT)
Dept: PRIMARY CARE | Facility: CLINIC | Age: 23
End: 2023-12-22
Payer: COMMERCIAL

## 2023-12-22 ENCOUNTER — APPOINTMENT (OUTPATIENT)
Dept: PRIMARY CARE | Facility: CLINIC | Age: 23
End: 2023-12-22
Payer: COMMERCIAL

## 2023-12-22 VITALS
DIASTOLIC BLOOD PRESSURE: 78 MMHG | OXYGEN SATURATION: 97 % | WEIGHT: 280 LBS | SYSTOLIC BLOOD PRESSURE: 112 MMHG | BODY MASS INDEX: 41.35 KG/M2 | TEMPERATURE: 98.1 F | HEART RATE: 91 BPM

## 2023-12-22 DIAGNOSIS — J02.9 SORE THROAT: ICD-10-CM

## 2023-12-22 LAB — POC RAPID STREP: NEGATIVE

## 2023-12-22 PROCEDURE — 99213 OFFICE O/P EST LOW 20 MIN: CPT | Performed by: NURSE PRACTITIONER

## 2023-12-22 PROCEDURE — 87081 CULTURE SCREEN ONLY: CPT

## 2023-12-22 PROCEDURE — 87880 STREP A ASSAY W/OPTIC: CPT | Performed by: NURSE PRACTITIONER

## 2023-12-22 PROCEDURE — 1036F TOBACCO NON-USER: CPT | Performed by: NURSE PRACTITIONER

## 2023-12-22 PROCEDURE — 3008F BODY MASS INDEX DOCD: CPT | Performed by: NURSE PRACTITIONER

## 2023-12-22 RX ORDER — BUPROPION HYDROCHLORIDE 150 MG/1
450 TABLET ORAL EVERY MORNING
COMMUNITY
Start: 2023-04-18

## 2023-12-22 ASSESSMENT — ENCOUNTER SYMPTOMS
STRIDOR: 0
STRIDOR: 0
NECK PAIN: 0
SWOLLEN GLANDS: 0
DIARRHEA: 0
TROUBLE SWALLOWING: 0
HOARSE VOICE: 0
SINUS PAIN: 0
HEADACHES: 0
VOMITING: 0
FEVER: 1
HOARSE VOICE: 0
CHILLS: 0
SORE THROAT: 1
NAUSEA: 0
ARTHRALGIAS: 0
HEADACHES: 0
ABDOMINAL PAIN: 0
SWOLLEN GLANDS: 0
ABDOMINAL PAIN: 0
SHORTNESS OF BREATH: 0
SHORTNESS OF BREATH: 0
TROUBLE SWALLOWING: 0
NECK PAIN: 0
RHINORRHEA: 1
FATIGUE: 0
COUGH: 0
COUGH: 0
VOMITING: 0
SORE THROAT: 1
DIARRHEA: 0
SINUS PRESSURE: 0

## 2023-12-22 ASSESSMENT — PAIN SCALES - GENERAL: PAINLEVEL: 7

## 2023-12-22 NOTE — PROGRESS NOTES
Answers submitted by the patient for this visit:  Sore Throat Questionnaire (Submitted on 12/22/2023)  Chief Complaint: Sore throat  Chronicity: new  Onset: yesterday  Progression since onset: unchanged  Pain worse on: neither  Fever: 100 - 100.9 F  Fever duration: less than 1 day  Pain - numeric: 8/10  abdominal pain: No  congestion: No  cough: No  diarrhea: No  drooling: No  ear discharge: No  ear pain: Yes  headaches: No  hoarse voice: No  neck pain: No  plugged ear sensation: No  shortness of breath: No  stridor: No  swollen glands: No  trouble swallowing: No  vomiting: No  strep: No  mono: No

## 2023-12-22 NOTE — PROGRESS NOTES
Subjective   Patient ID: Royal Carter is a 23 y.o. female who presents for Sore Throat (For 2 days, painful swallowing. Headache intermittently, sinus drainage.).    Sore Throat   This is a new problem. The current episode started yesterday. The problem has been unchanged. Neither side of throat is experiencing more pain than the other. The maximum temperature recorded prior to her arrival was 100 - 100.9 F. The fever has been present for Less than 1 day. The pain is at a severity of 8/10. Associated symptoms include ear pain. Pertinent negatives include no abdominal pain, congestion, coughing, diarrhea, drooling, ear discharge, headaches, hoarse voice, plugged ear sensation, neck pain, shortness of breath, stridor, swollen glands, trouble swallowing or vomiting. She has had no exposure to strep or mono.   Sx began earlier this week with GI upset which was attributed to Ozempic however sx have worsened - See ROS  Feels like razor blades when swallowing  Has post nasal drainage and congestion. Gastric symptoms have resolved  No home covid19 test done  Oral intake normal  No contacts ill    Review of Systems   Constitutional:  Positive for fever. Negative for chills and fatigue.   HENT:  Positive for ear pain, postnasal drip, rhinorrhea and sore throat. Negative for congestion, drooling, ear discharge, hoarse voice, sinus pressure, sinus pain and trouble swallowing.    Respiratory:  Negative for cough, shortness of breath and stridor.    Gastrointestinal:  Negative for abdominal pain, diarrhea, nausea and vomiting.   Musculoskeletal:  Negative for arthralgias and neck pain.   Neurological:  Negative for headaches.       Objective   /78   Pulse 91   Temp 36.7 °C (98.1 °F)   Wt 127 kg (280 lb)   SpO2 97%   BMI 41.35 kg/m²     Physical Exam  Alert and oriented x 3, no acute distress  Bilateral TMs dull.  Scant cerumen in auditory canals  Nares red and patent.  Posterior pharynx erythema noted.  No exudate.   Uvula midline.  No sinus tenderness  Neck supple without lymphadenopathy  Lungs clear to auscultation bilaterally  Heart with regular rate and rhythm  Skin warm and dry without rash    Assessment/Plan   Diagnoses and all orders for this visit:  Sore throat  -     POCT Rapid Strep A manually resulted  -     Group A Streptococcus, Culture  Rapid strep negative  Culture sent - await results  Likely viral  Fluids, rest, otc as directed  Follow up 7-10 days INB

## 2023-12-24 ENCOUNTER — PATIENT MESSAGE (OUTPATIENT)
Dept: OBSTETRICS AND GYNECOLOGY | Facility: CLINIC | Age: 23
End: 2023-12-24
Payer: COMMERCIAL

## 2023-12-24 DIAGNOSIS — Z32.00 ENCOUNTER FOR PREGNANCY TEST, RESULT UNKNOWN: Primary | ICD-10-CM

## 2023-12-25 LAB — S PYO THROAT QL CULT: NORMAL

## 2023-12-26 ENCOUNTER — TELEPHONE (OUTPATIENT)
Dept: OBSTETRICS AND GYNECOLOGY | Facility: CLINIC | Age: 23
End: 2023-12-26
Payer: COMMERCIAL

## 2023-12-26 ENCOUNTER — LAB (OUTPATIENT)
Dept: LAB | Facility: LAB | Age: 23
End: 2023-12-26
Payer: COMMERCIAL

## 2023-12-26 DIAGNOSIS — Z32.00 ENCOUNTER FOR PREGNANCY TEST, RESULT UNKNOWN: ICD-10-CM

## 2023-12-26 LAB — B-HCG SERPL-ACNC: <3 MIU/ML

## 2023-12-26 PROCEDURE — 36415 COLL VENOUS BLD VENIPUNCTURE: CPT

## 2023-12-26 PROCEDURE — 84702 CHORIONIC GONADOTROPIN TEST: CPT

## 2024-01-05 ENCOUNTER — APPOINTMENT (OUTPATIENT)
Dept: PRIMARY CARE | Facility: CLINIC | Age: 24
End: 2024-01-05
Payer: COMMERCIAL

## 2024-01-08 ENCOUNTER — APPOINTMENT (OUTPATIENT)
Dept: PRIMARY CARE | Facility: CLINIC | Age: 24
End: 2024-01-08
Payer: COMMERCIAL

## 2024-01-10 ENCOUNTER — APPOINTMENT (OUTPATIENT)
Dept: PRIMARY CARE | Facility: CLINIC | Age: 24
End: 2024-01-10
Payer: COMMERCIAL

## 2024-01-11 ENCOUNTER — OFFICE VISIT (OUTPATIENT)
Dept: PRIMARY CARE | Facility: CLINIC | Age: 24
End: 2024-01-11
Payer: COMMERCIAL

## 2024-01-11 VITALS
HEART RATE: 84 BPM | SYSTOLIC BLOOD PRESSURE: 114 MMHG | OXYGEN SATURATION: 98 % | WEIGHT: 283 LBS | BODY MASS INDEX: 41.79 KG/M2 | DIASTOLIC BLOOD PRESSURE: 76 MMHG

## 2024-01-11 DIAGNOSIS — E66.01 CLASS 3 SEVERE OBESITY WITHOUT SERIOUS COMORBIDITY WITH BODY MASS INDEX (BMI) OF 40.0 TO 44.9 IN ADULT, UNSPECIFIED OBESITY TYPE (MULTI): ICD-10-CM

## 2024-01-11 DIAGNOSIS — E28.2 PCOS (POLYCYSTIC OVARIAN SYNDROME): Primary | ICD-10-CM

## 2024-01-11 DIAGNOSIS — R63.8 UNABLE TO LOSE WEIGHT: ICD-10-CM

## 2024-01-11 PROCEDURE — 3008F BODY MASS INDEX DOCD: CPT | Performed by: NURSE PRACTITIONER

## 2024-01-11 PROCEDURE — 99213 OFFICE O/P EST LOW 20 MIN: CPT | Performed by: NURSE PRACTITIONER

## 2024-01-11 PROCEDURE — 1036F TOBACCO NON-USER: CPT | Performed by: NURSE PRACTITIONER

## 2024-01-11 NOTE — PROGRESS NOTES
Subjective   Patient ID: Royal Carter is a 23 y.o. female who presents for Follow-up (Ozempic. ).    HPI   Royal is a 23-year-old female who presents for medication follow-up  She was started on Ozempic in October 2023 for metabolic syndrome, Obesity, PCOS  She was experiencing nausea, heartburn and diarrhea intermittently while taking the medication  Pt is doing well with ozempic 0.5 after a 2 week holiday  Decreased appetite and using protein shakes  Doing her exercise EOD    She is thinking of trying to conceive at the end of the year    Review of Systems  Constitutional Symptoms: Negative for fever, loss of appetite, headaches, fatigue.   Cardiovascular: Negative for chest pain/pressure, palpitations, edema  Respiratory: Negative for shortness of breath, dyspnea on exertion, pain with breathing, coughing.   Gastrointestinal: Negative for nausea, vomiting, abdominal pain, change in bowel habits  Musculoskeletal: Negative for joint pain, joint swelling, myalgias, cramps.   Integumentary: Negative for skin trouble or rash.   Neurological: Negative for headache, numbness, tingling, weakness, tremors.   Psychiatric: Negative for depression, anxiety.   Endocrine: Negative for weight gain, heat or cold intolerance, polyuria, polydipsia, polyphagia.   Hematologic/Lymphatic: Negative for bruising, abnormal bleeding, swollen glands.    Objective   /76   Pulse 84   Wt 128 kg (283 lb)   SpO2 98%   BMI 41.79 kg/m²     Physical Exam  alert and oriented x3, NAD  Neck supple with no JVD  Lungs CTA bilaterally  Heart with RRR with no edema.  Abd obese, soft NT/ND  skin warm and dry  Neuro grossly intact    Assessment/Plan   Diagnoses and all orders for this visit:  PCOS (polycystic ovarian syndrome)  Class 3 severe obesity without serious comorbidity with body mass index (BMI) of 40.0 to 44.9 in adult, unspecified obesity type (CMS/HCC)  Unable to lose weight    Continue current medication and Ozempic dosing.  Due to  history of side effects, we will not titrate up at this time  Patient is encouraged healthy diet, choose protein and drink water  Add exercise  Continue current plan and follow-up in 4 weeks for reevaluation  Should patient decide to conceive, she would need a 3 month window from Ozempic discontinuation to conceiving. Verbalized understanding.  She is encouraged to speak with her gynecologist

## 2024-01-17 ENCOUNTER — TELEMEDICINE (OUTPATIENT)
Dept: PRIMARY CARE | Facility: CLINIC | Age: 24
End: 2024-01-17
Payer: COMMERCIAL

## 2024-01-17 DIAGNOSIS — U07.1 COVID-19: Primary | ICD-10-CM

## 2024-01-17 PROCEDURE — 99213 OFFICE O/P EST LOW 20 MIN: CPT | Performed by: NURSE PRACTITIONER

## 2024-01-17 ASSESSMENT — ENCOUNTER SYMPTOMS
COUGH: 0
FEVER: 1
ARTHRALGIAS: 0
VOMITING: 0
DIARRHEA: 0
RHINORRHEA: 1
LIGHT-HEADEDNESS: 0
HEADACHES: 1
SINUS PAIN: 1
DIZZINESS: 0
CHILLS: 0
SORE THROAT: 0
NAUSEA: 0
SHORTNESS OF BREATH: 0
SINUS PRESSURE: 1

## 2024-01-17 NOTE — PROGRESS NOTES
Subjective   Patient ID: Royal Carter is a 23 y.o. female who presents for Cough (Sinus pressure, congestion, bilateral ear pain, and headache. COVID positive 1/16. Symptoms began 1/14. Daughter tested positive 1/12. ).    Cough  Associated symptoms include ear pain, a fever, headaches, postnasal drip and rhinorrhea. Pertinent negatives include no chest pain, chills, sore throat or shortness of breath.      I have communicated my name and active licensure. The patient's identity and physical location were verified at the time of this visit. Either the patient or their legal representative has been informed of the risks and benefits of -- and alternatives to -- treatment through a remote evaluation and consents to proceed with the evaluation remotely.     Sx began Sunday 1/14, tested positive for covid19 yesterday  Daughter ill with same  Using nasal decongestant otc  Oral intake normal  See hpi    Review of Systems   Constitutional:  Positive for fever. Negative for chills.   HENT:  Positive for congestion, ear pain, postnasal drip, rhinorrhea, sinus pressure and sinus pain. Negative for sneezing and sore throat.    Respiratory:  Negative for cough and shortness of breath.    Cardiovascular:  Negative for chest pain.   Gastrointestinal:  Negative for diarrhea, nausea and vomiting.   Musculoskeletal:  Negative for arthralgias.   Neurological:  Positive for headaches. Negative for dizziness and light-headedness.       Objective   There were no vitals taken for this visit.    Physical Exam  Alert oriented x3, no acute distress  breathing unlabored  Speaks full sentences  Answers appropriately  Skin appears normal color  Neuro grossly intact  Exam limited due to tele med platform    Assessment/Plan   Diagnoses and all orders for this visit:  COVID-19  Quarantine guideline discussed  OTC as directed  Paxlovid discussed and refused  Fluids, rest, humidifier, MVI  Follow up 10-14 days INB

## 2024-02-15 ENCOUNTER — LAB (OUTPATIENT)
Dept: LAB | Facility: LAB | Age: 24
End: 2024-02-15
Payer: COMMERCIAL

## 2024-02-15 ENCOUNTER — OFFICE VISIT (OUTPATIENT)
Dept: OBSTETRICS AND GYNECOLOGY | Facility: CLINIC | Age: 24
End: 2024-02-15
Payer: COMMERCIAL

## 2024-02-15 VITALS — BODY MASS INDEX: 41.35 KG/M2 | DIASTOLIC BLOOD PRESSURE: 60 MMHG | WEIGHT: 280 LBS | SYSTOLIC BLOOD PRESSURE: 116 MMHG

## 2024-02-15 DIAGNOSIS — Z32.02 PREGNANCY TEST NEGATIVE: ICD-10-CM

## 2024-02-15 DIAGNOSIS — Z31.41 FERTILITY TESTING: Primary | ICD-10-CM

## 2024-02-15 DIAGNOSIS — Z31.41 FERTILITY TESTING: ICD-10-CM

## 2024-02-15 DIAGNOSIS — Z00.00 HEALTHCARE MAINTENANCE: ICD-10-CM

## 2024-02-15 DIAGNOSIS — E66.01 CLASS 3 SEVERE OBESITY DUE TO EXCESS CALORIES WITH BODY MASS INDEX (BMI) OF 40.0 TO 44.9 IN ADULT, UNSPECIFIED WHETHER SERIOUS COMORBIDITY PRESENT (MULTI): ICD-10-CM

## 2024-02-15 DIAGNOSIS — N96 RECURRENT PREGNANCY LOSS: ICD-10-CM

## 2024-02-15 LAB
ALBUMIN SERPL BCP-MCNC: 4.6 G/DL (ref 3.4–5)
ALP SERPL-CCNC: 89 U/L (ref 33–110)
ALT SERPL W P-5'-P-CCNC: 33 U/L (ref 7–45)
ANION GAP SERPL CALC-SCNC: 15 MMOL/L (ref 10–20)
AST SERPL W P-5'-P-CCNC: 18 U/L (ref 9–39)
BILIRUB SERPL-MCNC: 0.4 MG/DL (ref 0–1.2)
BUN SERPL-MCNC: 9 MG/DL (ref 6–23)
CALCIUM SERPL-MCNC: 10.2 MG/DL (ref 8.6–10.6)
CARDIOLIPIN IGA SERPL-ACNC: <0.5 APL U/ML
CARDIOLIPIN IGG SER IA-ACNC: <1.6 GPL U/ML
CARDIOLIPIN IGM SER IA-ACNC: 0.2 MPL U/ML
CHLORIDE SERPL-SCNC: 106 MMOL/L (ref 98–107)
CO2 SERPL-SCNC: 23 MMOL/L (ref 21–32)
CREAT SERPL-MCNC: 0.64 MG/DL (ref 0.5–1.05)
EGFRCR SERPLBLD CKD-EPI 2021: >90 ML/MIN/1.73M*2
GLUCOSE SERPL-MCNC: 100 MG/DL (ref 74–99)
POTASSIUM SERPL-SCNC: 4.3 MMOL/L (ref 3.5–5.3)
PREGNANCY TEST URINE, POC: NEGATIVE
PROLACTIN SERPL-MCNC: 4.8 UG/L (ref 3–20)
PROT SERPL-MCNC: 7.6 G/DL (ref 6.4–8.2)
SODIUM SERPL-SCNC: 140 MMOL/L (ref 136–145)
TSH SERPL-ACNC: 0.7 MIU/L (ref 0.44–3.98)

## 2024-02-15 PROCEDURE — 84146 ASSAY OF PROLACTIN: CPT

## 2024-02-15 PROCEDURE — 3008F BODY MASS INDEX DOCD: CPT | Performed by: OBSTETRICS & GYNECOLOGY

## 2024-02-15 PROCEDURE — 83036 HEMOGLOBIN GLYCOSYLATED A1C: CPT

## 2024-02-15 PROCEDURE — 86038 ANTINUCLEAR ANTIBODIES: CPT

## 2024-02-15 PROCEDURE — 81025 URINE PREGNANCY TEST: CPT | Performed by: OBSTETRICS & GYNECOLOGY

## 2024-02-15 PROCEDURE — 80053 COMPREHEN METABOLIC PANEL: CPT

## 2024-02-15 PROCEDURE — 1036F TOBACCO NON-USER: CPT | Performed by: OBSTETRICS & GYNECOLOGY

## 2024-02-15 PROCEDURE — 36415 COLL VENOUS BLD VENIPUNCTURE: CPT

## 2024-02-15 PROCEDURE — 99213 OFFICE O/P EST LOW 20 MIN: CPT | Performed by: OBSTETRICS & GYNECOLOGY

## 2024-02-15 PROCEDURE — 84443 ASSAY THYROID STIM HORMONE: CPT

## 2024-02-15 PROCEDURE — 86147 CARDIOLIPIN ANTIBODY EA IG: CPT

## 2024-02-15 NOTE — PROGRESS NOTES
ASSESSMENT/PLAN    Fertility testing  - TSH with reflex to Free T4 if abnormal; Future  - Prolactin; Future   - Await husbands semen analysis.     Healthcare maintenance  Elevated transaminase 10/2023  - Comprehensive Metabolic Panel; Future    Recurrent pregnancy loss-  Early pregnancy losses, chemical pregnancies.  Prior DENNY positive.   - DENNY without Reflex MILAN; Future  - Anti-Cardiolipin Antibody (IgA, IgG, and IgM); Future    4. Class 3 severe obesity due to excess calories with body mass index (BMI) of 40.0 to 44.9 in adult, unspecified whether serious comorbidity present (CMS/[facility])  - Hemoglobin A1C;   - Encouraged continued weight loss.   - Discussed option of letrazole.         SUBJECTIVE    HPI    22 yo  presents for discussion regarding fertility.   Last visti 2023.   Trying for pregnancy since 2023.   Pt chemical pregnancy loss . Dec had positive home pregnancy test then had negative blood test, had bleeding. Pt asks about hormonal imbalance and early pregnancy loss. We discussed option of progesterone support after ovulation. Pt DENNY positive, addl testing negative for lupus per pt.   H/o PCOS. Pt reports cycles are regular. Ovulation predictor kits are positive for ovulation. Pt was on metformin this past year. Stopped in Oct 2023.   On ozempic from Oct to Dec. Lost 20+ lbs. Continues to lose weight after stopping.   10/2023 Comp panel, elevated ALT 56.  Hgb A1c elevated 5.9.     s/p IOL 38 weeks.    has requisition to get semen analysis done.     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 This is unchanged compared to previous exam. Post partial  resection of left ovary.      OBJECTIVE    /60   Wt 127 kg (280 lb)   LMP 2024 (Exact Date)   BMI 41.35 kg/m²     Physical Exam    No exam done.       Constitutional: Alert and in no acute distress. Well developed, well nourished   Psychiatric: alert and oriented x 3., affect normal to patient baseline and mood: appropriate       Josseline Whitmore MD

## 2024-02-16 LAB
ANA PATTERN: ABNORMAL
ANA SER QL HEP2 SUBST: POSITIVE
ANA TITR SER IF: ABNORMAL {TITER}

## 2024-02-19 ENCOUNTER — APPOINTMENT (OUTPATIENT)
Dept: OBSTETRICS AND GYNECOLOGY | Facility: CLINIC | Age: 24
End: 2024-02-19
Payer: COMMERCIAL

## 2024-02-20 LAB
EST. AVERAGE GLUCOSE BLD GHB EST-MCNC: 120 MG/DL
HBA1C MFR BLD: 5.8 %

## 2024-02-27 ENCOUNTER — TELEPHONE (OUTPATIENT)
Dept: OBSTETRICS AND GYNECOLOGY | Facility: CLINIC | Age: 24
End: 2024-02-27
Payer: COMMERCIAL

## 2024-02-28 NOTE — TELEPHONE ENCOUNTER
"TC with pt.    SA normal count but high percentage of abnormal semen. Her  was referred to  \"T\", male repro endocrine.   Pt came off of ozempic 12/8. Would like pts BMI less than 40 before taking meds for conception.   Pt to work on that, current BMI 41.  will have his appt in March. After that will consider Letrozole vs clomid  "

## 2024-07-30 DIAGNOSIS — B37.9 YEAST INFECTION: ICD-10-CM

## 2024-07-30 RX ORDER — TERCONAZOLE 8 MG/G
1 CREAM VAGINAL NIGHTLY
COMMUNITY
End: 2024-07-30 | Stop reason: ENTERED-IN-ERROR

## 2024-07-30 RX ORDER — TERCONAZOLE 4 MG/G
1 CREAM VAGINAL NIGHTLY
COMMUNITY
Start: 2021-09-27 | End: 2024-07-30 | Stop reason: SDUPTHER

## 2024-08-01 RX ORDER — TERCONAZOLE 4 MG/G
1 CREAM VAGINAL NIGHTLY
Qty: 45 G | Refills: 0 | Status: SHIPPED | OUTPATIENT
Start: 2024-08-01

## 2024-10-09 ENCOUNTER — TELEPHONE (OUTPATIENT)
Dept: OBSTETRICS AND GYNECOLOGY | Facility: CLINIC | Age: 24
End: 2024-10-09
Payer: MEDICAID

## 2024-10-14 DIAGNOSIS — E28.2 PCOS (POLYCYSTIC OVARIAN SYNDROME): Primary | ICD-10-CM

## 2024-10-14 RX ORDER — METFORMIN HYDROCHLORIDE 500 MG/1
500 TABLET ORAL
Qty: 60 TABLET | Refills: 0 | Status: SHIPPED | OUTPATIENT
Start: 2024-10-14

## 2024-10-14 NOTE — PROGRESS NOTES
TC with pt. Got  last month. Had normal cycle beginning of Sept. Spotting for two days end of Sept, increased discharge recently. Negative pregnancy tests.   Has been trying for pregnancy for a while. Blood work 2/2024 was normal except elevated hgbA1C 5.8. Obesity with weight loss In past pt on ozempic and was on metformin.   Due for RA. Pt advised to call and schedule.  Will order hgbA1C and recommend starting metformin 500 mg po twice daily then will increase next month.

## 2024-10-15 DIAGNOSIS — E66.01 CLASS 3 SEVERE OBESITY DUE TO EXCESS CALORIES WITH BODY MASS INDEX (BMI) OF 40.0 TO 44.9 IN ADULT, UNSPECIFIED WHETHER SERIOUS COMORBIDITY PRESENT: Primary | ICD-10-CM

## 2024-10-15 DIAGNOSIS — E66.813 CLASS 3 SEVERE OBESITY DUE TO EXCESS CALORIES WITH BODY MASS INDEX (BMI) OF 40.0 TO 44.9 IN ADULT, UNSPECIFIED WHETHER SERIOUS COMORBIDITY PRESENT: Primary | ICD-10-CM

## 2024-10-29 ENCOUNTER — LAB (OUTPATIENT)
Dept: LAB | Facility: LAB | Age: 24
End: 2024-10-29
Payer: MEDICAID

## 2024-10-29 ENCOUNTER — APPOINTMENT (OUTPATIENT)
Dept: OBSTETRICS AND GYNECOLOGY | Facility: CLINIC | Age: 24
End: 2024-10-29
Payer: MEDICAID

## 2024-10-29 VITALS
WEIGHT: 283 LBS | DIASTOLIC BLOOD PRESSURE: 60 MMHG | BODY MASS INDEX: 40.52 KG/M2 | SYSTOLIC BLOOD PRESSURE: 114 MMHG | HEIGHT: 70 IN

## 2024-10-29 DIAGNOSIS — E66.813 CLASS 3 SEVERE OBESITY DUE TO EXCESS CALORIES WITH BODY MASS INDEX (BMI) OF 40.0 TO 44.9 IN ADULT, UNSPECIFIED WHETHER SERIOUS COMORBIDITY PRESENT: ICD-10-CM

## 2024-10-29 DIAGNOSIS — E66.01 CLASS 3 SEVERE OBESITY DUE TO EXCESS CALORIES WITH BODY MASS INDEX (BMI) OF 40.0 TO 44.9 IN ADULT, UNSPECIFIED WHETHER SERIOUS COMORBIDITY PRESENT: ICD-10-CM

## 2024-10-29 DIAGNOSIS — Z00.00 HEALTHCARE MAINTENANCE: ICD-10-CM

## 2024-10-29 DIAGNOSIS — Z01.419 ENCOUNTER FOR WELL WOMAN EXAM WITH ROUTINE GYNECOLOGICAL EXAM: Primary | ICD-10-CM

## 2024-10-29 LAB
ALBUMIN SERPL BCP-MCNC: 4.4 G/DL (ref 3.4–5)
ALP SERPL-CCNC: 108 U/L (ref 33–110)
ALT SERPL W P-5'-P-CCNC: 58 U/L (ref 7–45)
ANION GAP SERPL CALC-SCNC: 15 MMOL/L (ref 10–20)
AST SERPL W P-5'-P-CCNC: 21 U/L (ref 9–39)
BILIRUB SERPL-MCNC: 0.4 MG/DL (ref 0–1.2)
BUN SERPL-MCNC: 10 MG/DL (ref 6–23)
CALCIUM SERPL-MCNC: 9.9 MG/DL (ref 8.6–10.6)
CHLORIDE SERPL-SCNC: 104 MMOL/L (ref 98–107)
CO2 SERPL-SCNC: 26 MMOL/L (ref 21–32)
CREAT SERPL-MCNC: 0.57 MG/DL (ref 0.5–1.05)
EGFRCR SERPLBLD CKD-EPI 2021: >90 ML/MIN/1.73M*2
GLUCOSE SERPL-MCNC: 110 MG/DL (ref 74–99)
POTASSIUM SERPL-SCNC: 4.4 MMOL/L (ref 3.5–5.3)
PROT SERPL-MCNC: 7.5 G/DL (ref 6.4–8.2)
SODIUM SERPL-SCNC: 141 MMOL/L (ref 136–145)

## 2024-10-29 PROCEDURE — 36415 COLL VENOUS BLD VENIPUNCTURE: CPT

## 2024-10-29 PROCEDURE — 99395 PREV VISIT EST AGE 18-39: CPT | Performed by: OBSTETRICS & GYNECOLOGY

## 2024-10-29 PROCEDURE — 3008F BODY MASS INDEX DOCD: CPT | Performed by: OBSTETRICS & GYNECOLOGY

## 2024-10-30 PROBLEM — N92.0 MENORRHAGIA: Status: RESOLVED | Noted: 2023-09-30 | Resolved: 2024-10-30

## 2024-10-30 PROBLEM — M62.89 HIGH-TONE PELVIC FLOOR DYSFUNCTION IN FEMALE: Status: RESOLVED | Noted: 2023-09-30 | Resolved: 2024-10-30

## 2024-10-30 PROBLEM — M32.9: Status: RESOLVED | Noted: 2023-09-30 | Resolved: 2024-10-30

## 2024-10-30 PROBLEM — R07.81 PLEURITIC CHEST PAIN: Status: RESOLVED | Noted: 2023-09-30 | Resolved: 2024-10-30

## 2024-10-30 PROBLEM — J02.0 STREPTOCOCCAL SORE THROAT: Status: RESOLVED | Noted: 2023-09-30 | Resolved: 2024-10-30

## 2024-10-30 PROBLEM — J20.9 ACUTE BRONCHITIS: Status: RESOLVED | Noted: 2023-09-30 | Resolved: 2024-10-30

## 2024-10-30 PROBLEM — L02.419 ABSCESS OF AXILLA: Status: RESOLVED | Noted: 2023-09-30 | Resolved: 2024-10-30

## 2024-10-30 PROBLEM — N39.0 ACUTE URINARY TRACT INFECTION: Status: RESOLVED | Noted: 2023-09-30 | Resolved: 2024-10-30

## 2024-10-30 PROBLEM — J06.9 ACUTE UPPER RESPIRATORY INFECTION: Status: RESOLVED | Noted: 2023-09-30 | Resolved: 2024-10-30

## 2024-10-30 PROBLEM — J01.90 ACUTE SINUSITIS: Status: RESOLVED | Noted: 2023-09-30 | Resolved: 2024-10-30

## 2024-10-30 PROBLEM — R31.9 BLOOD IN URINE: Status: RESOLVED | Noted: 2023-09-30 | Resolved: 2024-10-30

## 2024-10-30 PROBLEM — R50.9 FEVER: Status: RESOLVED | Noted: 2023-09-30 | Resolved: 2024-10-30

## 2024-10-30 PROBLEM — B37.31 VAGINAL YEAST INFECTION: Status: RESOLVED | Noted: 2023-09-30 | Resolved: 2024-10-30

## 2024-10-30 PROBLEM — R06.00 DYSPNEA: Status: RESOLVED | Noted: 2023-09-30 | Resolved: 2024-10-30

## 2024-10-30 PROBLEM — R52 PAIN: Status: RESOLVED | Noted: 2023-09-30 | Resolved: 2024-10-30

## 2024-10-30 PROBLEM — J02.9 SORE THROAT: Status: RESOLVED | Noted: 2023-09-30 | Resolved: 2024-10-30

## 2024-10-30 PROBLEM — R53.83 FATIGUE: Status: RESOLVED | Noted: 2023-09-30 | Resolved: 2024-10-30

## 2024-10-30 PROBLEM — N93.9 ABNORMAL VAGINAL BLEEDING: Status: RESOLVED | Noted: 2023-09-30 | Resolved: 2024-10-30

## 2024-11-01 LAB
EST. AVERAGE GLUCOSE BLD GHB EST-MCNC: 114 MG/DL
HBA1C MFR BLD: 5.6 %

## 2024-11-04 DIAGNOSIS — E66.01 CLASS 3 SEVERE OBESITY WITHOUT SERIOUS COMORBIDITY WITH BODY MASS INDEX (BMI) OF 40.0 TO 44.9 IN ADULT, UNSPECIFIED OBESITY TYPE: Primary | ICD-10-CM

## 2024-11-04 DIAGNOSIS — E66.813 CLASS 3 SEVERE OBESITY WITHOUT SERIOUS COMORBIDITY WITH BODY MASS INDEX (BMI) OF 40.0 TO 44.9 IN ADULT, UNSPECIFIED OBESITY TYPE: Primary | ICD-10-CM

## 2024-11-04 RX ORDER — METFORMIN HYDROCHLORIDE 1000 MG/1
1000 TABLET ORAL
Qty: 60 TABLET | Refills: 11 | Status: SHIPPED | OUTPATIENT
Start: 2024-11-04 | End: 2025-11-04